# Patient Record
Sex: MALE | Race: WHITE | Employment: OTHER | URBAN - METROPOLITAN AREA
[De-identification: names, ages, dates, MRNs, and addresses within clinical notes are randomized per-mention and may not be internally consistent; named-entity substitution may affect disease eponyms.]

---

## 2017-03-11 ENCOUNTER — TELEPHONE (OUTPATIENT)
Dept: INTERNAL MEDICINE CLINIC | Facility: CLINIC | Age: 72
End: 2017-03-11

## 2017-03-11 NOTE — TELEPHONE ENCOUNTER
Patient's wife calling to request and lab order from Dr. Osmany Reyes for blood work to check his leucocitosis   Please enter order and advise patient or wife once is ready.  Advise orders take several days

## 2017-03-13 NOTE — TELEPHONE ENCOUNTER
Left message to call back. Need reason why pt. Need testing for leucocytosis, Is patient sick, sign and symptoms?   Transfer to [facility] 11-

## 2017-03-13 NOTE — TELEPHONE ENCOUNTER
Pt's wife called back. She is not on HIPAA. She was informed to have the pt calls us back.  She verbalized understanding and will ask the pt call us

## 2017-03-17 ENCOUNTER — LAB REQUISITION (OUTPATIENT)
Dept: LAB | Facility: HOSPITAL | Age: 72
End: 2017-03-17
Payer: MEDICARE

## 2017-03-17 DIAGNOSIS — R23.9 SKIN CHANGE: ICD-10-CM

## 2017-03-17 DIAGNOSIS — D38.5 NEOPLASM OF UNCERTAIN BEHAVIOR OF OTHER RESPIRATORY ORGANS (CODE): ICD-10-CM

## 2017-03-17 PROCEDURE — 88305 TISSUE EXAM BY PATHOLOGIST: CPT | Performed by: PLASTIC SURGERY

## 2017-04-10 ENCOUNTER — LAB REQUISITION (OUTPATIENT)
Dept: LAB | Facility: HOSPITAL | Age: 72
End: 2017-04-10
Payer: MEDICARE

## 2017-04-10 DIAGNOSIS — D04.30: ICD-10-CM

## 2017-04-10 PROCEDURE — 88305 TISSUE EXAM BY PATHOLOGIST: CPT | Performed by: PLASTIC SURGERY

## 2017-04-26 ENCOUNTER — TELEPHONE (OUTPATIENT)
Dept: INTERNAL MEDICINE CLINIC | Facility: CLINIC | Age: 72
End: 2017-04-26

## 2017-04-26 NOTE — TELEPHONE ENCOUNTER
Pt requesting a lab order for CBC, Chem 25, Cardiac risk. Please call patient when orders are ready.

## 2017-05-18 NOTE — TELEPHONE ENCOUNTER
Pt's wife called in to follow up on labs. Pt's wife would like a call back with confirmation once labs are ordered, please.

## 2017-05-23 NOTE — TELEPHONE ENCOUNTER
Dr. Kain Arevalo,    Patients wife is following up on the blood test order requested a month ago. Please advise  LOV 11/14/2016  Pt requesting a lab order for CBC, Chem 25, Cardiac risk.

## 2017-05-23 NOTE — TELEPHONE ENCOUNTER
Spoke with pt. Informed per Dr Lavon Padilla verbal recommendation to ask pt. To come  in the office for f/u. Nk will order test during visit. Pt. Verbalized understanding.

## 2017-07-05 ENCOUNTER — TELEPHONE (OUTPATIENT)
Dept: INTERNAL MEDICINE CLINIC | Facility: CLINIC | Age: 72
End: 2017-07-05

## 2017-07-05 NOTE — TELEPHONE ENCOUNTER
Actions Requested: Ok to use SDS 7/19 or 7/20 - please see Order Call encounter 4/26/17 - only days pt available and has been feeling more tired and worn down lately  Problem: More tired and worn down   Onset and Timing: Couple weeks  Associated Symptoms:

## 2017-07-19 ENCOUNTER — HOSPITAL ENCOUNTER (OUTPATIENT)
Dept: GENERAL RADIOLOGY | Age: 72
Discharge: HOME | End: 2017-07-19
Attending: INTERNAL MEDICINE
Payer: MEDICARE

## 2017-07-19 ENCOUNTER — HOSPITAL ENCOUNTER (OUTPATIENT)
Dept: GENERAL RADIOLOGY | Age: 72
Discharge: HOME | End: 2017-07-19
Attending: INTERNAL MEDICINE | Admitting: INTERNAL MEDICINE
Payer: MEDICARE

## 2017-07-19 ENCOUNTER — OFFICE VISIT (OUTPATIENT)
Dept: INTERNAL MEDICINE CLINIC | Facility: CLINIC | Age: 72
End: 2017-07-19

## 2017-07-19 ENCOUNTER — LAB ENCOUNTER (OUTPATIENT)
Dept: LAB | Age: 72
End: 2017-07-19
Attending: INTERNAL MEDICINE
Payer: MEDICARE

## 2017-07-19 VITALS
DIASTOLIC BLOOD PRESSURE: 70 MMHG | SYSTOLIC BLOOD PRESSURE: 137 MMHG | BODY MASS INDEX: 26 KG/M2 | WEIGHT: 183 LBS | HEART RATE: 50 BPM

## 2017-07-19 DIAGNOSIS — M25.522 LEFT ELBOW PAIN: ICD-10-CM

## 2017-07-19 DIAGNOSIS — R53.83 OTHER FATIGUE: ICD-10-CM

## 2017-07-19 DIAGNOSIS — R53.83 OTHER FATIGUE: Primary | ICD-10-CM

## 2017-07-19 LAB
ALBUMIN SERPL BCP-MCNC: 4.4 G/DL (ref 3.5–4.8)
ALBUMIN/GLOB SERPL: 1.8 {RATIO} (ref 1–2)
ALP SERPL-CCNC: 49 U/L (ref 32–100)
ALT SERPL-CCNC: 37 U/L (ref 17–63)
ANION GAP SERPL CALC-SCNC: 7 MMOL/L (ref 0–18)
AST SERPL-CCNC: 35 U/L (ref 15–41)
BILIRUB SERPL-MCNC: 0.9 MG/DL (ref 0.3–1.2)
BUN SERPL-MCNC: 18 MG/DL (ref 8–20)
BUN/CREAT SERPL: 19.4 (ref 10–20)
CALCIUM SERPL-MCNC: 9.4 MG/DL (ref 8.5–10.5)
CHLORIDE SERPL-SCNC: 105 MMOL/L (ref 95–110)
CO2 SERPL-SCNC: 27 MMOL/L (ref 22–32)
CREAT SERPL-MCNC: 0.93 MG/DL (ref 0.5–1.5)
CRP SERPL-MCNC: <0.5 MG/DL (ref 0–0.9)
ERYTHROCYTE [SEDIMENTATION RATE] IN BLOOD: 5 MM/HR (ref 0–20)
GLOBULIN PLAS-MCNC: 2.5 G/DL (ref 2.5–3.7)
GLUCOSE SERPL-MCNC: 92 MG/DL (ref 70–99)
LDH SERPL L TO P-CCNC: 202 U/L (ref 98–192)
OSMOLALITY UR CALC.SUM OF ELEC: 290 MOSM/KG (ref 275–295)
POTASSIUM SERPL-SCNC: 4.4 MMOL/L (ref 3.3–5.1)
PROT SERPL-MCNC: 6.9 G/DL (ref 5.9–8.4)
SODIUM SERPL-SCNC: 139 MMOL/L (ref 136–144)
TSH SERPL-ACNC: 1.02 UIU/ML (ref 0.45–5.33)

## 2017-07-19 PROCEDURE — 86140 C-REACTIVE PROTEIN: CPT

## 2017-07-19 PROCEDURE — G0463 HOSPITAL OUTPT CLINIC VISIT: HCPCS | Performed by: INTERNAL MEDICINE

## 2017-07-19 PROCEDURE — 83615 LACTATE (LD) (LDH) ENZYME: CPT

## 2017-07-19 PROCEDURE — 73070 X-RAY EXAM OF ELBOW: CPT | Performed by: INTERNAL MEDICINE

## 2017-07-19 PROCEDURE — 99214 OFFICE O/P EST MOD 30 MIN: CPT | Performed by: INTERNAL MEDICINE

## 2017-07-19 PROCEDURE — 85025 COMPLETE CBC W/AUTO DIFF WBC: CPT

## 2017-07-19 PROCEDURE — 71020 XR CHEST PA + LAT CHEST (CPT=71020): CPT | Performed by: INTERNAL MEDICINE

## 2017-07-19 PROCEDURE — 85652 RBC SED RATE AUTOMATED: CPT

## 2017-07-19 PROCEDURE — 85060 BLOOD SMEAR INTERPRETATION: CPT

## 2017-07-19 PROCEDURE — 80053 COMPREHEN METABOLIC PANEL: CPT

## 2017-07-19 PROCEDURE — 36415 COLL VENOUS BLD VENIPUNCTURE: CPT

## 2017-07-19 PROCEDURE — 84443 ASSAY THYROID STIM HORMONE: CPT

## 2017-07-20 ENCOUNTER — TELEPHONE (OUTPATIENT)
Dept: INTERNAL MEDICINE CLINIC | Facility: CLINIC | Age: 72
End: 2017-07-20

## 2017-07-20 LAB
BASOPHILS # BLD: 0.1 K/UL (ref 0–0.2)
BASOPHILS NFR BLD: 1 %
EOSINOPHIL # BLD: 0.3 K/UL (ref 0–0.7)
EOSINOPHIL NFR BLD: 2 %
ERYTHROCYTE [DISTWIDTH] IN BLOOD BY AUTOMATED COUNT: 13.1 % (ref 11–15)
HCT VFR BLD AUTO: 44.6 % (ref 41–52)
HGB BLD-MCNC: 15.1 G/DL (ref 13.5–17.5)
LYMPHOCYTES # BLD: 6.8 K/UL (ref 1–4)
LYMPHOCYTES NFR BLD: 65 %
MCH RBC QN AUTO: 31.9 PG (ref 27–32)
MCHC RBC AUTO-ENTMCNC: 33.9 G/DL (ref 32–37)
MCV RBC AUTO: 94 FL (ref 80–100)
MONOCYTES # BLD: 0.6 K/UL (ref 0–1)
MONOCYTES NFR BLD: 5 %
NEUTROPHILS # BLD AUTO: 2.8 K/UL (ref 1.8–7.7)
NEUTROPHILS NFR BLD: 27 %
PLATELET # BLD AUTO: 149 K/UL (ref 140–400)
PMV BLD AUTO: 10.5 FL (ref 7.4–10.3)
RBC # BLD AUTO: 4.74 M/UL (ref 4.5–5.9)
WBC # BLD AUTO: 10.6 K/UL (ref 4–11)

## 2017-07-20 NOTE — PROGRESS NOTES
HPI:    Patient ID: Louann Álvarez is a 81year old male presents for evaluation of the fatigue    HPI  Patient reports that last 6 months and he feels extremely tired, drained, he has very little stamina.   No desire to do routine activities, he states karla 200 mg by mouth every 6 (six) hours as needed for Pain (as needed). Disp:  Rfl:    Omega-3 Fatty Acids (FISH OIL) 1200 MG Oral Cap Take  by mouth 2 (two) times daily.  Disp:  Rfl:    Flaxseed, Linseed, (FLAX SEED OIL) 1000 MG Oral Cap Take  by mouth 2 (two) sounds. No murmur heard. Edema not present. Pulmonary/Chest: Effort normal and breath sounds normal. No respiratory distress. He has no wheezes. He has no rales. Abdominal: Soft. Bowel sounds are normal. There is no hepatosplenomegaly.  There is no

## 2017-10-16 ENCOUNTER — OFFICE VISIT (OUTPATIENT)
Dept: INTERNAL MEDICINE CLINIC | Facility: CLINIC | Age: 72
End: 2017-10-16

## 2017-10-16 ENCOUNTER — HOSPITAL ENCOUNTER (OUTPATIENT)
Dept: ULTRASOUND IMAGING | Facility: HOSPITAL | Age: 72
Discharge: HOME | End: 2017-10-16
Attending: INTERNAL MEDICINE | Admitting: INTERNAL MEDICINE
Payer: MEDICARE

## 2017-10-16 VITALS
DIASTOLIC BLOOD PRESSURE: 68 MMHG | BODY MASS INDEX: 27 KG/M2 | SYSTOLIC BLOOD PRESSURE: 125 MMHG | WEIGHT: 187 LBS | HEART RATE: 51 BPM

## 2017-10-16 DIAGNOSIS — R60.0 LEG EDEMA, RIGHT: ICD-10-CM

## 2017-10-16 DIAGNOSIS — R60.0 LEG EDEMA, RIGHT: Primary | ICD-10-CM

## 2017-10-16 PROCEDURE — 99214 OFFICE O/P EST MOD 30 MIN: CPT | Performed by: INTERNAL MEDICINE

## 2017-10-16 PROCEDURE — G0463 HOSPITAL OUTPT CLINIC VISIT: HCPCS | Performed by: INTERNAL MEDICINE

## 2017-10-16 PROCEDURE — 93971 EXTREMITY STUDY: CPT | Performed by: INTERNAL MEDICINE

## 2017-10-16 RX ORDER — CEFADROXIL 500 MG/1
500 CAPSULE ORAL 2 TIMES DAILY
Qty: 14 CAPSULE | Refills: 0 | Status: SHIPPED | OUTPATIENT
Start: 2017-10-16 | End: 2017-11-02

## 2017-10-16 RX ORDER — ATORVASTATIN CALCIUM 10 MG/1
10 TABLET, FILM COATED ORAL DAILY
Qty: 90 TABLET | Refills: 3 | Status: SHIPPED | OUTPATIENT
Start: 2017-10-16 | End: 2018-05-08

## 2017-10-17 ENCOUNTER — TELEPHONE (OUTPATIENT)
Dept: INTERNAL MEDICINE CLINIC | Facility: CLINIC | Age: 72
End: 2017-10-17

## 2017-10-17 NOTE — PROGRESS NOTES
HPI:    Patient ID: Sheila Mesa is a 81year old male.   Presents for evaluation of the redness of the leg    HPI  Patient reports that several days ago he developed fever chills, for that he has a flu, and that followed by swelling and redness of the ri (FISH OIL) 1200 MG Oral Cap Take  by mouth 2 (two) times daily. Disp:  Rfl:    Flaxseed, Linseed, (FLAX SEED OIL) 1000 MG Oral Cap Take  by mouth 2 (two) times daily. Disp:  Rfl:    MAGNESIUM GLYCINATE PLUS OR Take  by mouth daily.  Disp:  Rfl:    GLUCOSA-C started on cefadroxil 500 mg twice a day for 7 days, report if no improvement    No orders of the defined types were placed in this encounter.       Meds This Visit:  Signed Prescriptions Disp Refills    atorvastatin 10 MG Oral Tab 90 tablet 3      Sig: [facility]

## 2017-10-17 NOTE — TELEPHONE ENCOUNTER
My , Maye Luong ([id number]) does not have \"my chart\" yet.  He needs to see Dr. Peyton Harris or referral please.  We are both patients of Dr. Isaias Alcocer  Admitted 53-.  KI: Cellulitis of Right Lower Extremity with Systemic Symptoms.    ER

## 2017-11-02 ENCOUNTER — OFFICE VISIT (OUTPATIENT)
Dept: HEMATOLOGY/ONCOLOGY | Facility: HOSPITAL | Age: 72
End: 2017-11-02
Attending: INTERNAL MEDICINE
Payer: MEDICARE

## 2017-11-02 VITALS
SYSTOLIC BLOOD PRESSURE: 136 MMHG | WEIGHT: 186 LBS | HEART RATE: 55 BPM | RESPIRATION RATE: 16 BRPM | TEMPERATURE: 98 F | HEIGHT: 70 IN | DIASTOLIC BLOOD PRESSURE: 78 MMHG | BODY MASS INDEX: 26.63 KG/M2

## 2017-11-02 DIAGNOSIS — L03.115 CELLULITIS OF RIGHT LOWER EXTREMITY: ICD-10-CM

## 2017-11-02 DIAGNOSIS — K21.9 GERD WITHOUT ESOPHAGITIS: ICD-10-CM

## 2017-11-02 DIAGNOSIS — D72.820 LYMPHOCYTOSIS: Primary | ICD-10-CM

## 2017-11-02 DIAGNOSIS — Z86.12 HISTORY OF POLIOMYELITIS: ICD-10-CM

## 2017-11-02 PROCEDURE — 99214 OFFICE O/P EST MOD 30 MIN: CPT | Performed by: INTERNAL MEDICINE

## 2017-11-02 PROCEDURE — G0463 HOSPITAL OUTPT CLINIC VISIT: HCPCS | Performed by: INTERNAL MEDICINE

## 2017-11-03 PROBLEM — L03.115 CELLULITIS OF RIGHT LOWER EXTREMITY: Status: ACTIVE | Noted: 2017-11-03

## 2017-11-03 PROBLEM — Z86.12 HISTORY OF POLIOMYELITIS: Status: ACTIVE | Noted: 2017-11-03

## 2017-11-03 NOTE — PROGRESS NOTES
SANDRA Simon is a 81year old male seen in follow-up for his lymphocytosis. Patient was seen by Dr. Peyton Salinas and was treated for right leg cellulitis that began with erythema and swelling on 10/10/2017.   Patient has had episodes of cellulitis prev Delayed Release Take 20 mg by mouth every morning. Disp:  Rfl:    lisinopril (PRINIVIL,ZESTRIL) 5 MG Oral Tab Take 5 mg by mouth daily. Disp:  Rfl:    Aspirin-Acetaminophen-Caffeine (EXCEDRIN OR) Take  by mouth as needed.  Disp:  Rfl:    ibuprofen (ADVIL) 6 Glasses of wine per week         Drug use: No    Sexual activity: Yes    Partners: Female     Other Topics Concern   None on file     Social History Narrative   None on file     Family History   Problem Relation Age of Onset   • Heart Disorder Father Nursing note and vitals reviewed.           ASSESSMENT/PLAN:   Lymphocytosis  (primary encounter diagnosis)  Gerd without esophagitis  Cellulitis of right lower extremity  History of poliomyelitis        Isaac Dallas has had a lymphocytosis documented s predominant   monoclonal B-lymphocyte population (approximately 72% of   total cells within the lymphoid cell gate; overall   lymphocytes comprise approximately 64% of total cells on   CD45 vs. side light scatter gating).    .   This monoclonal population i lymphadenopathy is   not easily detected in the patient.       Component      Latest Ref Rng & Units 7/19/2017   Glucose      70 - 99 mg/dL 92   Sodium      136 - 144 mmol/L 139   Potassium      3.3 - 5.1 mmol/L 4.4   Chloride      95 - 110 mmol/L 105   Car fatigue   Notes Recorded by Rosio Mccarthy MD on 7/20/2017 at 7:47 PM CDT  Spoke to patient reported stable minimally abnormal blood count, increased lymphocyte count.  Normal sugar liver kidney function test thyroid test, 2 negative inflammatory markers,

## 2017-11-08 ENCOUNTER — LAB ENCOUNTER (OUTPATIENT)
Dept: LAB | Age: 72
End: 2017-11-08
Attending: INTERNAL MEDICINE
Payer: MEDICARE

## 2017-11-08 ENCOUNTER — OFFICE VISIT (OUTPATIENT)
Dept: INTERNAL MEDICINE CLINIC | Facility: CLINIC | Age: 72
End: 2017-11-08

## 2017-11-08 ENCOUNTER — PATIENT MESSAGE (OUTPATIENT)
Dept: INTERNAL MEDICINE CLINIC | Facility: CLINIC | Age: 72
End: 2017-11-08

## 2017-11-08 VITALS
SYSTOLIC BLOOD PRESSURE: 135 MMHG | DIASTOLIC BLOOD PRESSURE: 72 MMHG | HEART RATE: 55 BPM | HEIGHT: 70 IN | BODY MASS INDEX: 26.48 KG/M2 | TEMPERATURE: 97 F | WEIGHT: 185 LBS

## 2017-11-08 DIAGNOSIS — R60.0 LEG EDEMA, RIGHT: ICD-10-CM

## 2017-11-08 DIAGNOSIS — L03.115 CELLULITIS OF RIGHT LOWER EXTREMITY: Primary | ICD-10-CM

## 2017-11-08 DIAGNOSIS — L03.115 CELLULITIS OF RIGHT LOWER EXTREMITY: ICD-10-CM

## 2017-11-08 PROCEDURE — 99213 OFFICE O/P EST LOW 20 MIN: CPT | Performed by: INTERNAL MEDICINE

## 2017-11-08 PROCEDURE — G0463 HOSPITAL OUTPT CLINIC VISIT: HCPCS | Performed by: INTERNAL MEDICINE

## 2017-11-08 PROCEDURE — 36415 COLL VENOUS BLD VENIPUNCTURE: CPT

## 2017-11-08 PROCEDURE — 85025 COMPLETE CBC W/AUTO DIFF WBC: CPT

## 2017-11-08 RX ORDER — CLINDAMYCIN HYDROCHLORIDE 300 MG/1
300 CAPSULE ORAL 3 TIMES DAILY
Qty: 30 CAPSULE | Refills: 0 | Status: SHIPPED | OUTPATIENT
Start: 2017-11-08 | End: 2017-11-18

## 2017-11-08 NOTE — PATIENT INSTRUCTIONS
Discharge Instructions for Cellulitis  You have been diagnosed with cellulitis. This is an infection in the deepest layer of the skin. In some cases, the infection also affects the muscle. Cellulitis is caused by bacteria.  The bacteria can enter the body Date Last Reviewed: 8/1/2016  © 6957-6550 [employer] [address]. [address] [facility], [facility] [place]. All rights reserved. This information is not intended as a substitute for professional medical care.  Always follow your healthcare professional' What if I miss a dose? If you miss a dose, take it as soon as you can. If it is almost time for your next dose, take only that dose. Do not take double or extra doses. Where should I keep my medicine? Keep out of the reach of children.   Store at room te

## 2017-11-08 NOTE — PROGRESS NOTES
Patient ID: Nathan Cody is a 81year old male. Patient presents with:  Cellulitis (integumentary, infectious): R leg       HISTORY OF PRESENT ILLNESS:   HPI  Patient resents for above.   Recently diagnosed with cellulitis and was placed on a seven-day Rfl:   •  ibuprofen (ADVIL) 200 MG Oral Tab, Take 200 mg by mouth every 6 (six) hours as needed for Pain (as needed). , Disp: , Rfl:   •  Omega-3 Fatty Acids (FISH OIL) 1200 MG Oral Cap, Take  by mouth 2 (two) times daily. , Disp: , Rfl:   •  Flaxseed, Linse Weight: 185 lb (83.9 kg)   Height: 5' 10\" (1.778 m)       Body mass index is 26.54 kg/m². Physical Exam   Constitutional: He appears well-developed and well-nourished. Cardiovascular: Normal rate, regular rhythm and normal heart sounds.     Pulmonar

## 2017-11-09 NOTE — TELEPHONE ENCOUNTER
From: Adria Tam  To: Jessie Briseno MD  Sent: 11/8/2017 10:17 PM CST  Subject: Non-Urgent Medical Question    MyChart records do not show that Dr. Monique Merlos did a colonoscopy on 8/7/2014 at [facility]. Please update MyChart.

## 2018-05-08 ENCOUNTER — OFFICE VISIT (OUTPATIENT)
Dept: INTERNAL MEDICINE CLINIC | Facility: CLINIC | Age: 73
End: 2018-05-08

## 2018-05-08 VITALS
WEIGHT: 182 LBS | TEMPERATURE: 98 F | RESPIRATION RATE: 18 BRPM | SYSTOLIC BLOOD PRESSURE: 128 MMHG | DIASTOLIC BLOOD PRESSURE: 3 MMHG | HEIGHT: 68.25 IN | HEART RATE: 53 BPM | BODY MASS INDEX: 27.58 KG/M2

## 2018-05-08 DIAGNOSIS — Z12.5 PROSTATE CANCER SCREENING: ICD-10-CM

## 2018-05-08 DIAGNOSIS — I10 ESSENTIAL HYPERTENSION: ICD-10-CM

## 2018-05-08 DIAGNOSIS — E78.00 PURE HYPERCHOLESTEROLEMIA: ICD-10-CM

## 2018-05-08 DIAGNOSIS — Z00.00 PHYSICAL EXAM, ANNUAL: Primary | ICD-10-CM

## 2018-05-08 PROBLEM — L03.115 CELLULITIS OF RIGHT LOWER EXTREMITY: Status: RESOLVED | Noted: 2017-11-03 | Resolved: 2018-05-08

## 2018-05-08 PROCEDURE — G0439 PPPS, SUBSEQ VISIT: HCPCS | Performed by: INTERNAL MEDICINE

## 2018-05-08 RX ORDER — ATORVASTATIN CALCIUM 10 MG/1
10 TABLET, FILM COATED ORAL DAILY
Qty: 90 TABLET | Refills: 3 | Status: SHIPPED | OUTPATIENT
Start: 2018-05-08

## 2018-05-08 RX ORDER — FAMOTIDINE 40 MG/1
40 TABLET, FILM COATED ORAL DAILY
Qty: 90 TABLET | Refills: 3 | Status: SHIPPED | OUTPATIENT
Start: 2018-05-08 | End: 2018-07-09

## 2018-05-08 RX ORDER — LISINOPRIL 5 MG/1
5 TABLET ORAL DAILY
Qty: 90 TABLET | Refills: 3 | Status: SHIPPED | OUTPATIENT
Start: 2018-05-08 | End: 2019-04-13

## 2018-05-09 ENCOUNTER — LAB ENCOUNTER (OUTPATIENT)
Dept: LAB | Age: 73
End: 2018-05-09
Attending: INTERNAL MEDICINE
Payer: MEDICARE

## 2018-05-09 DIAGNOSIS — Z12.5 PROSTATE CANCER SCREENING: ICD-10-CM

## 2018-05-09 DIAGNOSIS — E78.00 PURE HYPERCHOLESTEROLEMIA: ICD-10-CM

## 2018-05-09 DIAGNOSIS — I10 ESSENTIAL HYPERTENSION: ICD-10-CM

## 2018-05-09 PROCEDURE — 85025 COMPLETE CBC W/AUTO DIFF WBC: CPT

## 2018-05-09 PROCEDURE — 80053 COMPREHEN METABOLIC PANEL: CPT

## 2018-05-09 PROCEDURE — 36415 COLL VENOUS BLD VENIPUNCTURE: CPT

## 2018-05-09 PROCEDURE — 80061 LIPID PANEL: CPT

## 2018-05-09 PROCEDURE — 84443 ASSAY THYROID STIM HORMONE: CPT

## 2018-05-09 NOTE — PROGRESS NOTES
HPI:   Fred Jean is a 72year old male who presents for a Medicare Subsequent Annual Wellness visit (Pt already had Initial Annual Wellness).     Patient reports that overall he has been doing well lately feels more tired, but he is working a lot latel without esophagitis     History of poliomyelitis     Cellulitis of right lower extremity    Wt Readings from Last 3 Encounters:  05/08/18 : 182 lb (82.6 kg)  11/08/17 : 185 lb (83.9 kg)  11/02/17 : 186 lb (84.4 kg)     Last Cholesterol Labs:     Lab Result Cholecalciferol (VITAMIN D3) 65424 UNITS Oral Cap Take 7,000 Units by mouth daily. B Complex Oral Cap Take  by mouth daily. Vitamin B-12 (VITAMIN B12) 1000 MCG Oral Tab Take 1,000 mcg by mouth daily.    aspirin 81 MG Oral Tab Take 81 mg by mouth daily following:    Height as of this encounter: 5' 8.25\" (1.734 m). Weight as of this encounter: 182 lb (82.6 kg).     Medicare Hearing Assessment  (Required for AWV/SWV)    Hearing Screening    Screening Method:  Questionnaire  I have a problem hearing over moist no oral lesions are noted  Neck/Thyroid: neck is supple without adenopathy  Lymphatic: no abnormal cervical, supraclavicular or axillary adenopathy is noted  Respiratory: normal to inspection lungs are clear to auscultation bilaterally normal respira for endoscopy    Lymphocytosis, etiology not clear, will repeat CBC, see hematologist if needed after test results available  Other orders  -     famotidine (PEPCID) 40 MG Oral Tab; Take 1 tablet (40 mg total) by mouth daily.   -     atorvastatin 10 MG Oral Glaucoma Screening      Ophthalmology Visit Annually: Diabetics, FHx Glaucoma, AA>50, > 65 No flowsheet data found.     Prostate Cancer Screening      PSA  Annually PSA due on 03/24/1997  Update Health Maintenance if applicable     Immunizations (

## 2018-05-11 ENCOUNTER — TELEPHONE (OUTPATIENT)
Dept: INTERNAL MEDICINE CLINIC | Facility: CLINIC | Age: 73
End: 2018-05-11

## 2018-05-11 NOTE — TELEPHONE ENCOUNTER
Pharm is calling per pt insurance will not cover this RX  famotidine (PEPCID) 40 MG Oral Tab  Insurance will cover the RX ranitidine TABLETS   Please advise with verbal or script   Please advise

## 2018-05-15 ENCOUNTER — TELEPHONE (OUTPATIENT)
Dept: INTERNAL MEDICINE CLINIC | Facility: CLINIC | Age: 73
End: 2018-05-15

## 2018-05-15 RX ORDER — RANITIDINE 150 MG/1
150 TABLET ORAL 2 TIMES DAILY
Qty: 180 TABLET | Refills: 1 | Status: SHIPPED | OUTPATIENT
Start: 2018-05-15 | End: 2018-12-19

## 2018-05-18 ENCOUNTER — TELEPHONE (OUTPATIENT)
Dept: INTERNAL MEDICINE CLINIC | Facility: CLINIC | Age: 73
End: 2018-05-18

## 2018-05-18 RX ORDER — CYCLOBENZAPRINE HCL 10 MG
10 TABLET ORAL NIGHTLY
Qty: 15 TABLET | Refills: 1 | Status: SHIPPED | OUTPATIENT
Start: 2018-05-18

## 2018-05-18 NOTE — TELEPHONE ENCOUNTER
Pt's spouse called asking for your advise regarding pt's LBP. Pt's pain has increased to a sharp pain radiating down the L side. Pt has been using advil and rest with no relief.  Pt is scheduled to leave town in 1 wk and is asking if you could please call @

## 2018-07-09 ENCOUNTER — HOSPITAL ENCOUNTER (OUTPATIENT)
Dept: GENERAL RADIOLOGY | Facility: HOSPITAL | Age: 73
Discharge: HOME | End: 2018-07-09
Attending: PHYSICAL MEDICINE & REHABILITATION
Payer: MEDICARE

## 2018-07-09 ENCOUNTER — OFFICE VISIT (OUTPATIENT)
Dept: NEUROLOGY | Facility: CLINIC | Age: 73
End: 2018-07-09

## 2018-07-09 VITALS
BODY MASS INDEX: 27 KG/M2 | WEIGHT: 178 LBS | DIASTOLIC BLOOD PRESSURE: 62 MMHG | SYSTOLIC BLOOD PRESSURE: 140 MMHG | HEART RATE: 64 BPM | RESPIRATION RATE: 14 BRPM

## 2018-07-09 DIAGNOSIS — G89.29 CHRONIC MIDLINE LOW BACK PAIN WITHOUT SCIATICA: ICD-10-CM

## 2018-07-09 DIAGNOSIS — M54.16 LUMBAR RADICULOPATHY: ICD-10-CM

## 2018-07-09 DIAGNOSIS — M54.50 CHRONIC MIDLINE LOW BACK PAIN WITHOUT SCIATICA: ICD-10-CM

## 2018-07-09 DIAGNOSIS — M54.16 LUMBAR RADICULOPATHY: Primary | ICD-10-CM

## 2018-07-09 PROCEDURE — 72110 X-RAY EXAM L-2 SPINE 4/>VWS: CPT | Performed by: PHYSICAL MEDICINE & REHABILITATION

## 2018-07-09 PROCEDURE — 99204 OFFICE O/P NEW MOD 45 MIN: CPT | Performed by: PHYSICAL MEDICINE & REHABILITATION

## 2018-07-09 RX ORDER — PREDNISONE 10 MG/1
10 TABLET ORAL DAILY
Qty: 28 EACH | Refills: 0 | Status: SHIPPED | OUTPATIENT
Start: 2018-07-09

## 2018-07-09 NOTE — PROGRESS NOTES
Low Back Pain H & P    Chief Complaint:  Patient presents with:  Low Back Pain: new patient here with chronic lower back pain that is localized originally starting 5-7 years ago.  pain occured after riding jet ski. pain flares up at times, last flare up 6wk numbness. · There is no tingling in the legs. · There is not weakness in both legs. Past Medical History   Past Medical History:   Diagnosis Date   • Hyperlipidemia    • Hyperthyroidism        Past Surgical History   No past surgical history on file. allergy, environment allergies, food allergies, seasonal allergies. Gait:   The patient has no difficulty walking. PE:  The patient does appear in his stated age in no distress. The patient is well groomed.     Psychiatric:  The patient is alert an mild-moderately limited   RIGHT hip flexion Negative pain   LEFT hip flexion Negative pain   RIGHT hip KALLIE test Negative for pain   LEFT hip KALLIE test Negative for pain   RIGHT hip internal rotation Negative for pain   LEFT hip internal rotation Negativ

## 2018-07-09 NOTE — PATIENT INSTRUCTIONS
As of October 6th 2014, the Drug Enforcement Agency [facility]) is reclassifying all hydrocodone combination medications from Schedule III to Schedule II. This includes medications such as Norco, Vicodin, Lortab, Zohydro, and Vicoprofen.     What this means for y chart.      Plan  He will get lumbar spine x-rays. He will start PT. He will take a prednisone taper. He will call next week with an update. He will call me after doing 2-3 weeks of the PT. He will follow in 3 months or sooner if needed.

## 2018-07-10 ENCOUNTER — OFFICE VISIT (OUTPATIENT)
Dept: PHYSICAL THERAPY | Age: 73
End: 2018-07-10
Attending: PHYSICAL MEDICINE & REHABILITATION
Payer: MEDICARE

## 2018-07-10 DIAGNOSIS — M54.16 LUMBAR RADICULOPATHY: ICD-10-CM

## 2018-07-10 DIAGNOSIS — M54.50 CHRONIC MIDLINE LOW BACK PAIN WITHOUT SCIATICA: ICD-10-CM

## 2018-07-10 DIAGNOSIS — G89.29 CHRONIC MIDLINE LOW BACK PAIN WITHOUT SCIATICA: ICD-10-CM

## 2018-07-10 PROCEDURE — 97110 THERAPEUTIC EXERCISES: CPT

## 2018-07-10 PROCEDURE — 97161 PT EVAL LOW COMPLEX 20 MIN: CPT

## 2018-07-10 NOTE — PROGRESS NOTES
LUMBAR SPINE EVALUATION:   Referring Physician: Dr. Sergio Perez DX:  Lumbar radiculopathy (M54.16)  Chronic midline low back pain without sciatica (M54.5,G89.29)     Date of Service: 7/10/2018       PATIENT SUMMARY   Martha Mcgee is a 72 year ol address the above impairments to decrease pain and return to prior level of function.        OBJECTIVE:   Observation/Posture: fair sitting posture, reduced lumbar lordosis       Movement loss:   Major Mod Min Nil Pain   Flexion  x   pain   Extension   x  N Re-education; Therapeutic Activity; Ultrasound;  Electrical Stim; Traction; Patient education: Home exercise program instruction    Education or treatment limitation: None  Rehab Potential:excellent       Current status G Code: Initial, Mobility: Walking an

## 2018-07-13 ENCOUNTER — APPOINTMENT (OUTPATIENT)
Dept: PHYSICAL THERAPY | Age: 73
End: 2018-07-13
Attending: PHYSICAL MEDICINE & REHABILITATION
Payer: MEDICARE

## 2018-07-13 PROCEDURE — 97110 THERAPEUTIC EXERCISES: CPT | Performed by: PHYSICAL THERAPIST

## 2018-07-13 NOTE — PROGRESS NOTES
Diagnosis:  Lumbar radiculopathy (M54.16)  Chronic midline low back pain without sciatica (M54.5,G89.29)    Authorized # of Visits:  8         Next MD visit: none scheduled  Fall Risk: standard         Precautions: n/a           Medication Changes since la next week.     Skilled Services: TE, Pt ed    Charges: TE3       Total Timed Treatment: 45 min  Total Treatment Time: 47 min

## 2018-07-17 ENCOUNTER — OFFICE VISIT (OUTPATIENT)
Dept: PHYSICAL THERAPY | Age: 73
End: 2018-07-17
Attending: PHYSICAL MEDICINE & REHABILITATION
Payer: MEDICARE

## 2018-07-17 ENCOUNTER — TELEPHONE (OUTPATIENT)
Dept: NEUROLOGY | Facility: CLINIC | Age: 73
End: 2018-07-17

## 2018-07-17 DIAGNOSIS — G89.29 CHRONIC MIDLINE LOW BACK PAIN WITHOUT SCIATICA: ICD-10-CM

## 2018-07-17 DIAGNOSIS — M54.16 LUMBAR RADICULOPATHY: ICD-10-CM

## 2018-07-17 DIAGNOSIS — M54.50 CHRONIC MIDLINE LOW BACK PAIN WITHOUT SCIATICA: ICD-10-CM

## 2018-07-17 PROCEDURE — 97110 THERAPEUTIC EXERCISES: CPT

## 2018-07-17 NOTE — PROGRESS NOTES
Diagnosis:  Lumbar radiculopathy (M54.16)  Chronic midline low back pain without sciatica (M54.5,G89.29)    Authorized # of Visits:  3/8         Next MD visit: none scheduled  Fall Risk: standard         Precautions: n/a           Medication Changes since

## 2018-07-17 NOTE — TELEPHONE ENCOUNTER
Patient calling with condition update. Patient states low back pain returns after standing from a sitting position. Patient states he can bend farther than one week ago. Patient is experiencing 70% relief after beginning PT.  Patient has completed 2 PT appo

## 2018-07-19 ENCOUNTER — OFFICE VISIT (OUTPATIENT)
Dept: PHYSICAL THERAPY | Age: 73
End: 2018-07-19
Attending: PHYSICAL MEDICINE & REHABILITATION
Payer: MEDICARE

## 2018-07-19 DIAGNOSIS — G89.29 CHRONIC MIDLINE LOW BACK PAIN WITHOUT SCIATICA: ICD-10-CM

## 2018-07-19 DIAGNOSIS — M54.16 LUMBAR RADICULOPATHY: ICD-10-CM

## 2018-07-19 DIAGNOSIS — M54.50 CHRONIC MIDLINE LOW BACK PAIN WITHOUT SCIATICA: ICD-10-CM

## 2018-07-19 PROCEDURE — 97110 THERAPEUTIC EXERCISES: CPT

## 2018-07-19 NOTE — PROGRESS NOTES
Diagnosis:  Lumbar radiculopathy (M54.16)  Chronic midline low back pain without sciatica (M54.5,G89.29)    Authorized # of Visits:  4/8         Next MD visit: none scheduled  Fall Risk: standard         Precautions: n/a           Medication Changes since

## 2018-07-19 NOTE — TELEPHONE ENCOUNTER
Called patient with Dr. Sharifa Bynum remarks. Patient does not want any pain meds at this time. Also, he will be stopping PT for a week as he will be traveling to [facility]. Verbalized understanding.

## 2018-07-19 NOTE — TELEPHONE ENCOUNTER
If the pain is tolerable, then he should continue with the PT and call back after doing 3 more weeks of the PT or if he has any worsening symptoms. Please ask the patient if he feel like he needs anything for the pain at this time.   If so, he can take Ult

## 2018-07-24 ENCOUNTER — OFFICE VISIT (OUTPATIENT)
Dept: PHYSICAL THERAPY | Age: 73
End: 2018-07-24
Attending: PHYSICAL MEDICINE & REHABILITATION
Payer: MEDICARE

## 2018-07-24 DIAGNOSIS — G89.29 CHRONIC MIDLINE LOW BACK PAIN WITHOUT SCIATICA: ICD-10-CM

## 2018-07-24 DIAGNOSIS — M54.16 LUMBAR RADICULOPATHY: ICD-10-CM

## 2018-07-24 DIAGNOSIS — M54.50 CHRONIC MIDLINE LOW BACK PAIN WITHOUT SCIATICA: ICD-10-CM

## 2018-07-24 PROCEDURE — 97110 THERAPEUTIC EXERCISES: CPT

## 2018-07-24 NOTE — PROGRESS NOTES
DISCHARGE SUMMARY    Diagnosis:  Lumbar radiculopathy (M54.16)  Chronic midline low back pain without sciatica (M54.5,G89.29)    Authorized # of Visits:  5/8         Next MD visit: none scheduled  Fall Risk: standard         Precautions: n/a           Medi 80/100    Charges: TE2       Total Timed Treatment:35 min  Total Treatment Time: 35 min  Thank you for your referral. Please co-sign or sign and return this letter via fax as soon as possible to [phone].  If you have any questions, please contact me at

## 2018-07-26 ENCOUNTER — APPOINTMENT (OUTPATIENT)
Dept: PHYSICAL THERAPY | Age: 73
End: 2018-07-26
Attending: PHYSICAL MEDICINE & REHABILITATION
Payer: MEDICARE

## 2018-07-31 ENCOUNTER — APPOINTMENT (OUTPATIENT)
Dept: PHYSICAL THERAPY | Age: 73
End: 2018-07-31
Attending: PHYSICAL MEDICINE & REHABILITATION
Payer: MEDICARE

## 2018-08-02 ENCOUNTER — APPOINTMENT (OUTPATIENT)
Dept: PHYSICAL THERAPY | Age: 73
End: 2018-08-02
Attending: PHYSICAL MEDICINE & REHABILITATION
Payer: MEDICARE

## 2018-10-13 ENCOUNTER — LAB ENCOUNTER (OUTPATIENT)
Dept: LAB | Facility: HOSPITAL | Age: 73
End: 2018-10-13
Attending: INTERNAL MEDICINE
Payer: MEDICARE

## 2018-10-13 DIAGNOSIS — D72.820 LYMPHOCYTOSIS: ICD-10-CM

## 2018-10-13 PROCEDURE — 85025 COMPLETE CBC W/AUTO DIFF WBC: CPT

## 2018-10-13 PROCEDURE — 36415 COLL VENOUS BLD VENIPUNCTURE: CPT

## 2018-10-13 PROCEDURE — 85060 BLOOD SMEAR INTERPRETATION: CPT

## 2018-10-15 ENCOUNTER — OFFICE VISIT (OUTPATIENT)
Dept: HEMATOLOGY/ONCOLOGY | Facility: HOSPITAL | Age: 73
End: 2018-10-15
Attending: INTERNAL MEDICINE
Payer: MEDICARE

## 2018-10-15 VITALS
WEIGHT: 182 LBS | BODY MASS INDEX: 27.58 KG/M2 | HEART RATE: 51 BPM | TEMPERATURE: 98 F | SYSTOLIC BLOOD PRESSURE: 146 MMHG | HEIGHT: 68.25 IN | DIASTOLIC BLOOD PRESSURE: 66 MMHG | RESPIRATION RATE: 18 BRPM

## 2018-10-15 DIAGNOSIS — G89.29 CHRONIC MIDLINE LOW BACK PAIN WITHOUT SCIATICA: ICD-10-CM

## 2018-10-15 DIAGNOSIS — D72.820 LYMPHOCYTOSIS: Primary | ICD-10-CM

## 2018-10-15 DIAGNOSIS — M54.50 CHRONIC MIDLINE LOW BACK PAIN WITHOUT SCIATICA: ICD-10-CM

## 2018-10-15 DIAGNOSIS — Z85.828 HISTORY OF BASAL CELL CARCINOMA OF SKIN: ICD-10-CM

## 2018-10-15 DIAGNOSIS — Z86.12 HISTORY OF POLIOMYELITIS: ICD-10-CM

## 2018-10-15 DIAGNOSIS — K21.9 GERD WITHOUT ESOPHAGITIS: ICD-10-CM

## 2018-10-15 PROCEDURE — 99213 OFFICE O/P EST LOW 20 MIN: CPT | Performed by: INTERNAL MEDICINE

## 2018-10-21 PROBLEM — Z85.828 HISTORY OF BASAL CELL CARCINOMA OF SKIN: Status: ACTIVE | Noted: 2018-10-21

## 2018-10-21 NOTE — PROGRESS NOTES
HPI Dennie Lust is a 72year old male seen in follow-up for his lymphocytosis. Patient was seen by Dr. Elinor Yoon and was treated for right leg cellulitis that began with erythema and swelling on 10/10/2017.   Patient has had episodes of cellulitis prev Negative for adenopathy. Psychiatric/Behavioral: Negative for dysphoric mood. The patient is not nervous/anxious. Current Outpatient Medications:  PredniSONE 10 MG (21) Oral Tablet Therapy Pack Take 10 mg by mouth daily.  7 tabs day 1, 6 tabs d Complex Oral Cap Take  by mouth daily. Disp:  Rfl:    Vitamin B-12 (VITAMIN B12) 1000 MCG Oral Tab Take 1,000 mcg by mouth daily. Disp:  Rfl:    aspirin 81 MG Oral Tab Take 81 mg by mouth daily. Disp:  Rfl:    MELATONIN-THEANINE OR Take  by mouth daily.  Jewell Salamanca use an assistive device. .        The patient does live in a home with stairs.     Family History   Problem Relation Age of Onset   • Heart Disorder Father    • Cancer Father         easophageal cancer, intestine   • Cancer Mother 80        breast cancer   • diagnosis)  Rhoda Antonio without esophagitis  Chronic midline low back pain without sciatica  History of poliomyelitis  History of basal cell carcinoma of skin      Angy Said has had a lymphocytosis documented since 5/21/15.  The peripheral blood flow cytometr Audrey Dawkins M.D.   FLOW CYTOMETRY INTERPRETATION   See Comment    Comment:   Immunophenotyping of peripheral blood by flow cytometry was   performed utilizing a nonacute (lymphoid cell) antibody   panel with CD38 analysis.    .   This evaluation demonstrates cubic millimeter. Nevertheless,   currently MBL is considered to be a premalignant condition   with progression to B-CLL requiring treatment at a rate of   1.1% per year.  In addition, some case of MBL may represent   manifestations of B-cell SLL in which t 1.8 - 7.7 K/UL 3.1 2.5 3.6 2.8   Lymphocytes Absolute      1.0 - 4.0 K/UL 1.6 2.1 5.1 (H) 6.8 (H)   Monocytes Absolute      0.0 - 1.0 K/UL 0.7 0.4 0.7 0.6   Eosinophils Absolute      0.0 - 0.7 K/UL 0.5 0.3 0.5 0.3   Basophils Absolute      0.0 - 0.2 K/U K/UL 0.6    Eosinophils Absolute      0.0 - 0.7 K/UL 0.3    Basophils Absolute      0.0 - 0.2 K/UL 0.1    LDH      98 - 192 U/L  172   TSH      0.45 - 5.33 uIU/mL     C-REACTIVE PROTEIN      0.0 - 0.9 mg/dL     SED RATE      0 - 20 mm/Hr       Collected:

## 2018-12-19 RX ORDER — RANITIDINE 150 MG/1
TABLET ORAL
Qty: 180 TABLET | Refills: 0 | Status: SHIPPED | OUTPATIENT
Start: 2018-12-19 | End: 2019-03-16

## 2018-12-31 RX ORDER — ATORVASTATIN CALCIUM 10 MG/1
TABLET, FILM COATED ORAL
Qty: 90 TABLET | Refills: 0 | Status: SHIPPED | OUTPATIENT
Start: 2018-12-31 | End: 2019-03-27

## 2019-03-16 RX ORDER — RANITIDINE 150 MG/1
TABLET ORAL
Qty: 180 TABLET | Refills: 0 | Status: SHIPPED | OUTPATIENT
Start: 2019-03-16 | End: 2019-06-09

## 2019-03-16 NOTE — TELEPHONE ENCOUNTER
Recent Visits  Date Type Provider Dept   05/08/18 Office Visit MD Rosy Bauman-Internal Med2   11/08/17 Office Visit Kylie Gonzales MD [facility]-Internal Med   10/16/17 Office Visit [facility]hendra Do [facility]   Showing recent visits within pas

## 2019-03-16 NOTE — TELEPHONE ENCOUNTER
Refill Protocol Appointment Criteria  · Appointment scheduled in the past 12 months or in the next 3 months  Recent Outpatient Visits            5 months ago [facility] Hematology Oncology Sweta Rizo MD    Office Visit    7

## 2019-03-16 NOTE — TELEPHONE ENCOUNTER
Refill passes protocol, but med flagged for high dose since Prilosec is listed on Med record.   Routed to MD for review

## 2019-03-27 RX ORDER — ATORVASTATIN CALCIUM 10 MG/1
TABLET, FILM COATED ORAL
Qty: 90 TABLET | Refills: 0 | Status: SHIPPED | OUTPATIENT
Start: 2019-03-27 | End: 2019-06-18

## 2019-03-27 NOTE — TELEPHONE ENCOUNTER
Recent Visits  Date Type Provider Dept   05/08/18 Office Visit MD Rosy Leonard-Internal Med2   11/08/17 Office Visit Marky Carter MD [facility]-Internal Med   10/16/17 Office Visit MD Rosy Leonard-Internal Med2   Showing recent visits within pas

## 2019-03-27 NOTE — TELEPHONE ENCOUNTER
Cholesterol Medications  Protocol Criteria:  · Appointment scheduled in the past 12 months or in the next 3 months  · ALT & LDL on file in the past 12 months  · ALT result < 80  · LDL result <130   Recent Outpatient Visits            5 months ago Lymphocyt

## 2019-04-14 RX ORDER — LISINOPRIL 5 MG/1
TABLET ORAL
Qty: 90 TABLET | Refills: 0 | Status: SHIPPED | OUTPATIENT
Start: 2019-04-14 | End: 2019-07-07

## 2019-06-11 RX ORDER — RANITIDINE 150 MG/1
TABLET ORAL
Qty: 180 TABLET | Refills: 1 | Status: SHIPPED | OUTPATIENT
Start: 2019-06-11

## 2019-06-11 NOTE — TELEPHONE ENCOUNTER
Please review; protocol failed.     Requested Prescriptions     Pending Prescriptions Disp Refills   • RANITIDINE  MG Oral Tab [Pharmacy Med Name: RANITIDINE 150MG TABLETS] 180 tablet 0     Sig: TAKE 1 TABLET(150 MG) BY MOUTH TWICE DAILY         Rece

## 2019-06-20 RX ORDER — ATORVASTATIN CALCIUM 10 MG/1
TABLET, FILM COATED ORAL
Qty: 90 TABLET | Refills: 1 | Status: SHIPPED | OUTPATIENT
Start: 2019-06-20 | End: 2019-12-26

## 2019-07-08 RX ORDER — LISINOPRIL 5 MG/1
TABLET ORAL
Qty: 90 TABLET | Refills: 0 | Status: SHIPPED | OUTPATIENT
Start: 2019-07-08 | End: 2019-10-01

## 2019-10-02 RX ORDER — LISINOPRIL 5 MG/1
TABLET ORAL
Qty: 90 TABLET | Refills: 0 | Status: SHIPPED | OUTPATIENT
Start: 2019-10-02 | End: 2019-12-26

## 2019-10-03 NOTE — TELEPHONE ENCOUNTER
Call patient I refilled her lisinopril for 90 days it is 1 year since last visit he needs follow-up will find physician at the new place of living, and no more refills without follow-up appointment

## 2019-10-03 NOTE — TELEPHONE ENCOUNTER
Pt was contacted and informed that his meds was refilled and that he has moved to [place] and is looking for MD

## 2019-10-03 NOTE — TELEPHONE ENCOUNTER
Appt request sent via [employer] please f/u, thanks    Please review; protocol failed.  D/t labs and appt  Requested Prescriptions     Pending Prescriptions Disp Refills   • LISINOPRIL 5 MG Oral Tab [Pharmacy Med Name: LISINOPRIL 5MG TABLETS] 90 tablet 0

## 2019-11-26 RX ORDER — RANITIDINE 150 MG/1
TABLET ORAL
Qty: 180 TABLET | Refills: 0 | OUTPATIENT
Start: 2019-11-26

## 2019-11-26 NOTE — TELEPHONE ENCOUNTER
Per 10/1/19 encounter [place] and was informed would be last refill from Dr Jagjit Stephen without OV.

## 2019-12-26 RX ORDER — LISINOPRIL 5 MG/1
TABLET ORAL
Qty: 90 TABLET | Refills: 0 | Status: SHIPPED | OUTPATIENT
Start: 2019-12-26

## 2019-12-26 RX ORDER — ATORVASTATIN CALCIUM 10 MG/1
TABLET, FILM COATED ORAL
Qty: 90 TABLET | Refills: 0 | Status: SHIPPED | OUTPATIENT
Start: 2019-12-26

## 2019-12-26 NOTE — TELEPHONE ENCOUNTER
KENROY please assist with scheduling 6 month f/u appt    Please review; protocol failed.  D/t labs and appt  Requested Prescriptions     Pending Prescriptions Disp Refills   • LISINOPRIL 5 MG Oral Tab [Pharmacy Med Name: LISINOPRIL 5MG TABLETS] 90 tablet 0

## 2019-12-27 NOTE — TELEPHONE ENCOUNTER
Please call patient I refilled medication for 90 days, he is due for follow-up visit, no more refills without follow-up

## 2020-01-22 NOTE — TELEPHONE ENCOUNTER
No upcoming appointments found. [employer] message sent to patient regarding need for follow up. If patient does not read message or make an appt in next couple days, please phone patient with 1 phone attempt.  Leave a detailed message on voicemail if able,

## 2020-03-22 RX ORDER — LISINOPRIL 5 MG/1
TABLET ORAL
Qty: 90 TABLET | Refills: 0 | OUTPATIENT
Start: 2020-03-22

## 2021-03-09 DIAGNOSIS — Z23 NEED FOR VACCINATION: ICD-10-CM
